# Patient Record
Sex: FEMALE | Race: WHITE | NOT HISPANIC OR LATINO | URBAN - METROPOLITAN AREA
[De-identification: names, ages, dates, MRNs, and addresses within clinical notes are randomized per-mention and may not be internally consistent; named-entity substitution may affect disease eponyms.]

---

## 2021-10-10 ENCOUNTER — EMERGENCY (EMERGENCY)
Facility: HOSPITAL | Age: 21
LOS: 1 days | Discharge: HOME | End: 2021-10-10
Attending: EMERGENCY MEDICINE | Admitting: EMERGENCY MEDICINE
Payer: COMMERCIAL

## 2021-10-10 VITALS
SYSTOLIC BLOOD PRESSURE: 116 MMHG | TEMPERATURE: 98 F | DIASTOLIC BLOOD PRESSURE: 80 MMHG | HEART RATE: 89 BPM | OXYGEN SATURATION: 100 % | WEIGHT: 119.93 LBS | RESPIRATION RATE: 16 BRPM | HEIGHT: 62 IN

## 2021-10-10 DIAGNOSIS — Z88.8 ALLERGY STATUS TO OTHER DRUGS, MEDICAMENTS AND BIOLOGICAL SUBSTANCES: ICD-10-CM

## 2021-10-10 DIAGNOSIS — R00.2 PALPITATIONS: ICD-10-CM

## 2021-10-10 DIAGNOSIS — Z88.0 ALLERGY STATUS TO PENICILLIN: ICD-10-CM

## 2021-10-10 PROCEDURE — 93010 ELECTROCARDIOGRAM REPORT: CPT

## 2021-10-10 PROCEDURE — 99284 EMERGENCY DEPT VISIT MOD MDM: CPT

## 2021-10-10 NOTE — ED PROVIDER NOTE - OBJECTIVE STATEMENT
20 yo female pt, presents w palpitations, feels her heart beats fast at times. Has been worked up for it by cardio. has had a normal echo and holter recently and has an appt to discuss results tomorrow. no chest pain, no sob, no abd pain, no nausea, no vomiting. Has had several medical events this summer including a covid infection and mono. No excess of caffeine or energy drinks. no fever, no chills.

## 2021-10-10 NOTE — ED ADULT TRIAGE NOTE - CHIEF COMPLAINT QUOTE
palpations x 3months, under the care of a cardiologist, worsened this am. pmh of anxiety and bicuspid valve

## 2021-10-10 NOTE — ED PROVIDER NOTE - PATIENT PORTAL LINK FT
You can access the FollowMyHealth Patient Portal offered by [facility] by registering at the following website: [email or web address]. By joining [employer]’s FollowMyHealth portal, you will also be able to view your health information using other applications (apps) compatible with our system.

## 2022-05-12 ENCOUNTER — EMERGENCY (EMERGENCY)
Facility: HOSPITAL | Age: 22
LOS: 1 days | Discharge: HOME | End: 2022-05-12
Attending: EMERGENCY MEDICINE | Admitting: EMERGENCY MEDICINE
Payer: COMMERCIAL

## 2022-05-12 VITALS
SYSTOLIC BLOOD PRESSURE: 110 MMHG | DIASTOLIC BLOOD PRESSURE: 75 MMHG | HEIGHT: 62 IN | WEIGHT: 119.93 LBS | OXYGEN SATURATION: 97 % | TEMPERATURE: 98 F | HEART RATE: 84 BPM | RESPIRATION RATE: 16 BRPM

## 2022-05-12 DIAGNOSIS — R00.2 PALPITATIONS: ICD-10-CM

## 2022-05-12 DIAGNOSIS — Z88.8 ALLERGY STATUS TO OTHER DRUGS, MEDICAMENTS AND BIOLOGICAL SUBSTANCES STATUS: ICD-10-CM

## 2022-05-12 DIAGNOSIS — F41.9 ANXIETY DISORDER, UNSPECIFIED: ICD-10-CM

## 2022-05-12 DIAGNOSIS — Z88.0 ALLERGY STATUS TO PENICILLIN: ICD-10-CM

## 2022-05-12 LAB
ALBUMIN SERPL ELPH-MCNC: 4 G/DL — SIGNIFICANT CHANGE UP (ref 3.4–5)
ALP SERPL-CCNC: 49 U/L — SIGNIFICANT CHANGE UP (ref 40–120)
ALT FLD-CCNC: 21 U/L — SIGNIFICANT CHANGE UP (ref 12–42)
ANION GAP SERPL CALC-SCNC: 8 MMOL/L — LOW (ref 9–16)
AST SERPL-CCNC: 20 U/L — SIGNIFICANT CHANGE UP (ref 15–37)
BASOPHILS # BLD AUTO: 0.02 K/UL — SIGNIFICANT CHANGE UP (ref 0–0.2)
BASOPHILS NFR BLD AUTO: 0.4 % — SIGNIFICANT CHANGE UP (ref 0–2)
BILIRUB SERPL-MCNC: 0.5 MG/DL — SIGNIFICANT CHANGE UP (ref 0.2–1.2)
BUN SERPL-MCNC: 13 MG/DL — SIGNIFICANT CHANGE UP (ref 7–23)
CALCIUM SERPL-MCNC: 8.8 MG/DL — SIGNIFICANT CHANGE UP (ref 8.5–10.5)
CHLORIDE SERPL-SCNC: 106 MMOL/L — SIGNIFICANT CHANGE UP (ref 96–108)
CO2 SERPL-SCNC: 26 MMOL/L — SIGNIFICANT CHANGE UP (ref 22–31)
CREAT SERPL-MCNC: 0.77 MG/DL — SIGNIFICANT CHANGE UP (ref 0.5–1.3)
EGFR: 111 ML/MIN/1.73M2 — SIGNIFICANT CHANGE UP
EOSINOPHIL # BLD AUTO: 0.03 K/UL — SIGNIFICANT CHANGE UP (ref 0–0.5)
EOSINOPHIL NFR BLD AUTO: 0.7 % — SIGNIFICANT CHANGE UP (ref 0–6)
GLUCOSE SERPL-MCNC: 93 MG/DL — SIGNIFICANT CHANGE UP (ref 70–99)
HCG SERPL-ACNC: <1 MIU/ML — SIGNIFICANT CHANGE UP
HCT VFR BLD CALC: 35 % — SIGNIFICANT CHANGE UP (ref 34.5–45)
HGB BLD-MCNC: 12 G/DL — SIGNIFICANT CHANGE UP (ref 11.5–15.5)
IMM GRANULOCYTES NFR BLD AUTO: 0.2 % — SIGNIFICANT CHANGE UP (ref 0–1.5)
LYMPHOCYTES # BLD AUTO: 1.32 K/UL — SIGNIFICANT CHANGE UP (ref 1–3.3)
LYMPHOCYTES # BLD AUTO: 29.3 % — SIGNIFICANT CHANGE UP (ref 13–44)
MAGNESIUM SERPL-MCNC: 2 MG/DL — SIGNIFICANT CHANGE UP (ref 1.6–2.6)
MCHC RBC-ENTMCNC: 30.8 PG — SIGNIFICANT CHANGE UP (ref 27–34)
MCHC RBC-ENTMCNC: 34.3 GM/DL — SIGNIFICANT CHANGE UP (ref 32–36)
MCV RBC AUTO: 90 FL — SIGNIFICANT CHANGE UP (ref 80–100)
MONOCYTES # BLD AUTO: 0.33 K/UL — SIGNIFICANT CHANGE UP (ref 0–0.9)
MONOCYTES NFR BLD AUTO: 7.3 % — SIGNIFICANT CHANGE UP (ref 2–14)
NEUTROPHILS # BLD AUTO: 2.8 K/UL — SIGNIFICANT CHANGE UP (ref 1.8–7.4)
NEUTROPHILS NFR BLD AUTO: 62.1 % — SIGNIFICANT CHANGE UP (ref 43–77)
NRBC # BLD: 0 /100 WBCS — SIGNIFICANT CHANGE UP (ref 0–0)
PLATELET # BLD AUTO: 295 K/UL — SIGNIFICANT CHANGE UP (ref 150–400)
POTASSIUM SERPL-MCNC: 4.2 MMOL/L — SIGNIFICANT CHANGE UP (ref 3.5–5.3)
POTASSIUM SERPL-SCNC: 4.2 MMOL/L — SIGNIFICANT CHANGE UP (ref 3.5–5.3)
PROT SERPL-MCNC: 6.7 G/DL — SIGNIFICANT CHANGE UP (ref 6.4–8.2)
RBC # BLD: 3.89 M/UL — SIGNIFICANT CHANGE UP (ref 3.8–5.2)
RBC # FLD: 12.2 % — SIGNIFICANT CHANGE UP (ref 10.3–14.5)
SODIUM SERPL-SCNC: 140 MMOL/L — SIGNIFICANT CHANGE UP (ref 132–145)
TSH SERPL-MCNC: 1.52 UIU/ML — SIGNIFICANT CHANGE UP (ref 0.36–3.74)
WBC # BLD: 4.51 K/UL — SIGNIFICANT CHANGE UP (ref 3.8–10.5)
WBC # FLD AUTO: 4.51 K/UL — SIGNIFICANT CHANGE UP (ref 3.8–10.5)

## 2022-05-12 PROCEDURE — 93010 ELECTROCARDIOGRAM REPORT: CPT | Mod: NC

## 2022-05-12 PROCEDURE — 99284 EMERGENCY DEPT VISIT MOD MDM: CPT | Mod: 25

## 2022-05-12 NOTE — ED PROVIDER NOTE - PATIENT PORTAL LINK FT
You can access the FollowMyHealth Patient Portal offered by [facility] by registering at the following website: [email or web address]. By joining [email or web address]’s FollowMyHealth portal, you will also be able to view your health information using other applications (apps) compatible with our system.

## 2022-05-12 NOTE — ED ADULT NURSE NOTE - OBJECTIVE STATEMENT
c/o palpitations s/p working out. Called cardiologist who stated to go to ER. Denies chest pain, SOB, current palpitations.

## 2022-05-12 NOTE — ED PROVIDER NOTE - ATTENDING CONTRIBUTION TO CARE
I have seen the pt, reviewed all pertinent clinical data, and I agree with the documentation/care/plan executed by Dr. Rossi.

## 2022-05-12 NOTE — ED PROVIDER NOTE - NSFOLLOWUPINSTRUCTIONS_ED_ALL_ED_FT
You were seen for palpitations.    Your work-up in the Emergency Department did not reveal anything acutely concerning.    Follow up with your primary care physician in the next 2-3 days and bring a copy of your results.    Continue taking all your medications as previously prescribed, unless specifically instructed not to do so by a physician.    If you have any severe increase in pain, fever, uncontrollable nausea vomiting, inability to tolerate eating and drinking, or any other concerning symptoms, return immediately to the Emergency Department.      Palpitations      Palpitations are feelings that your heartbeat is not normal. Your heartbeat may feel like it is:  •Uneven.      •Faster than normal.      •Fluttering.      •Skipping a beat.      This is usually not a serious problem. In some cases, you may need tests to rule out any serious problems.      Follow these instructions at home:    Pay attention to any changes in your condition. Take these actions to help manage your symptoms:    Eating and drinking   •Avoid:  •Coffee, tea, soft drinks, and energy drinks.      •Chocolate.      •Alcohol.      •Diet pills.          Lifestyle    •Try to lower your stress. These things can help you relax:  •Yoga.      •Deep breathing and meditation.      •Exercise.      •Using words and images to create positive thoughts (guided imagery).      •Using your mind to control things in your body (biofeedback).        • Do not use drugs.      •Get plenty of rest and sleep. Keep a regular bed time.        General instructions      •Take over-the-counter and prescription medicines only as told by your doctor.      • Do not use any products that contain nicotine or tobacco, such as cigarettes and e-cigarettes. If you need help quitting, ask your doctor.      •Keep all follow-up visits as told by your doctor. This is important. You may need more tests if palpitations do not go away or get worse.        Contact a doctor if:    •Your symptoms last more than 24 hours.      •Your symptoms occur more often.        Get help right away if you:    •Have chest pain.      •Feel short of breath.      •Have a very bad headache.      •Feel dizzy.      •Pass out (faint).        Summary    •Palpitations are feelings that your heartbeat is uneven or faster than normal. It may feel like your heart is fluttering or skipping a beat.      •Avoid food and drinks that may cause palpitations. These include caffeine, chocolate, and alcohol.      •Try to lower your stress. Do not smoke or use drugs.      •Get help right away if you faint or have chest pain, shortness of breath, a severe headache, or dizziness.      This information is not intended to replace advice given to you by your health care provider. Make sure you discuss any questions you have with your health care provider.

## 2022-05-12 NOTE — ED PROVIDER NOTE - CLINICAL SUMMARY MEDICAL DECISION MAKING FREE TEXT BOX
21F p/w palpitations while working out.  Sxs have since resolved.  Had similar sxs a year ago w/ extensive w/u thereafter, all of which were negative.  VSS, exam as above.  Will assess for anemia vs elec derangements vs thyroid abnormalities vs arrhythmias.  Will reassess and dispo pending results.

## 2022-05-12 NOTE — ED PROVIDER NOTE - PROGRESS NOTE DETAILS
Flo PGY3 - W/u unremarkable.  Discussed results w/ pt., who understands them.  Will dc w/ cards and PCP f/u.  Pt. aware and agrees w/ plan.  All other questions and concerns have been addressed.

## 2022-05-12 NOTE — ED PROVIDER NOTE - OBJECTIVE STATEMENT
21F w/ no significant PMHx p/w palpitations that she noticed while working out today morning.  States she felt slightly nauseous yesterday during her workout and had to leave early.  No cp, sob.  Sxs resolved ~30 mins ago.  Pt. had a similar experience last year and was thoroughly evaluated and seen by a cardiologist.  All w/u was unremarkable.  Pt. was diagnosed w/ anxiety and tried Lexapro but did not like the way it made her feel so dc/d it.  Pt. had thyroid studies this past March, which were normal.

## 2022-05-15 ENCOUNTER — EMERGENCY (EMERGENCY)
Facility: HOSPITAL | Age: 22
LOS: 1 days | Discharge: HOME | End: 2022-05-15
Admitting: EMERGENCY MEDICINE
Payer: COMMERCIAL

## 2022-05-15 VITALS
OXYGEN SATURATION: 97 % | TEMPERATURE: 98 F | WEIGHT: 119.93 LBS | SYSTOLIC BLOOD PRESSURE: 123 MMHG | RESPIRATION RATE: 18 BRPM | HEART RATE: 92 BPM | DIASTOLIC BLOOD PRESSURE: 88 MMHG | HEIGHT: 62 IN

## 2022-05-15 PROBLEM — F41.9 ANXIETY DISORDER, UNSPECIFIED: Chronic | Status: ACTIVE | Noted: 2022-05-12

## 2022-05-15 PROCEDURE — 99283 EMERGENCY DEPT VISIT LOW MDM: CPT

## 2022-05-15 RX ORDER — FAMOTIDINE 10 MG/ML
1 INJECTION INTRAVENOUS
Qty: 28 | Refills: 0
Start: 2022-05-15 | End: 2022-06-11

## 2022-05-15 RX ORDER — ONDANSETRON 8 MG/1
1 TABLET, FILM COATED ORAL
Qty: 15 | Refills: 0
Start: 2022-05-15

## 2022-05-15 NOTE — ED PROVIDER NOTE - PATIENT PORTAL LINK FT
You can access the FollowMyHealth Patient Portal offered by [facility] by registering at the following website: [email or web address]. By joining [employer]’s FollowMyHealth portal, you will also be able to view your health information using other applications (apps) compatible with our system.

## 2022-05-15 NOTE — ED ADULT NURSE NOTE - OBJECTIVE STATEMENT
Pt presents to ED complaining of nausea with "regurgitation" but not vomiting since Thursday after taking fluconazole for yeast infection. Denies any fevers, chills, diarrhea, constipation. Endorses decreased PO intake and periumbilical abdominal tenderness. Denies diff with urination. States that yeast inf has since cleared up.

## 2022-05-15 NOTE — ED PROVIDER NOTE - CLINICAL SUMMARY MEDICAL DECISION MAKING FREE TEXT BOX
young female with nausea and dyspepsia after taking diflucan for a vaginal yeast infection, now improved. tolerating po. had labs 3 days ago in ED which were normal, not pregnant. patient looks well, tolerating po. physical exam unremarkable. patient declined GI cocktail in ED. will rx zofran/pepcid. advised to start taking probiotics otc. lifestyle changes discussed, f/u pmd, return precautions discussed. will dc.

## 2022-05-15 NOTE — ED PROVIDER NOTE - PHYSICAL EXAMINATION
CONSTITUTIONAL: Well-appearing; well-nourished; in no apparent distress.   	HEAD: Normocephalic; atraumatic.   	EYES:  conjunctiva and sclera clear  	ENT: normal nose; no rhinorrhea; normal pharynx with no erythema or lesions.   	NECK: Supple; non-tender;   	CARDIOVASCULAR: Normal S1, S2; no murmurs, rubs, or gallops. Regular rate and rhythm.   	RESPIRATORY: Breathing easily; breath sounds clear and equal bilaterally; no wheezes, rhonchi, or rales.  	GI: Soft; non-distended; non-tender; no guarding or rebound. no cvat bilaterally.   	EXT: No cyanosis or edema; N/V intact  	SKIN: Normal for age and race; warm; dry; good turgor; no apparent lesions or rash.   	NEURO: A & O x 3; face symmetric; grossly unremarkable.   PSYCHOLOGICAL: The patient’s mood and manner are appropriate.

## 2022-05-15 NOTE — ED ADULT NURSE NOTE - VOIDING
I will STOP taking the medications listed below when I get home from the hospital:  None without difficulty

## 2022-05-15 NOTE — ED ADULT TRIAGE NOTE - CHIEF COMPLAINT QUOTE
c/o nausea with accompanying dizziness since Friday s/p taking prescribed fluconazole on thurs for yeast infection by PCP.  not able to eat food but hydrating well. was here on Thursday before seeing PCP for unrelated sxs. c/o nausea since Friday s/p taking prescribed fluconazole on thurs for yeast infection by PCP.  not able to eat food and is starting to feel dizzy but hydrating well. was here on Thursday before seeing PCP for unrelated sxs.

## 2022-05-15 NOTE — ED PROVIDER NOTE - NSFOLLOWUPINSTRUCTIONS_ED_ALL_ED_FT
Take medications as prescribed  Stay hydrated and drink plenty of fluids.  Follow up with your doctor for re-evaluation    Avoid foods or drinks that make your symptoms worse such as caffeine, chocolate, spicy foods, acidic foods, or alcohol.    SEEK IMMEDIATE MEDICAL CARE IF YOU HAVE ANY OF THE FOLLOWING SYMPTOMS: black or bloody stools, blood or coffee-ground-colored vomitus, worsening abdominal pain, fever, or inability to keep fluids down.

## 2022-05-15 NOTE — ED PROVIDER NOTE - OBJECTIVE STATEMENT
22 yo female seen 3 days ago in this ED for palpitations, had normal labs, hcg neg, presents c/o nausea after taking diflucan for a yeast infection a few days ago with assoc acid reflex symptoms, burning to epigastrium. no vomiting. tolerating po. history of acid reflux, had been on PPI in the past but no longer taking it as she states she didn't like the way it made her feel. currently feeling better overall and declining GI cocktail here. last endoscopy one year ago and was normal as per patient. no fever/chills. no cp/sob/syncope or back pain.

## 2022-05-15 NOTE — ED ADULT NURSE NOTE - CHIEF COMPLAINT QUOTE
c/o nausea since Friday s/p taking prescribed fluconazole on thurs for yeast infection by PCP.  not able to eat food and is starting to feel dizzy but hydrating well. was here on Thursday before seeing PCP for unrelated sxs.

## 2022-05-17 DIAGNOSIS — R11.0 NAUSEA: ICD-10-CM

## 2022-05-17 DIAGNOSIS — R10.13 EPIGASTRIC PAIN: ICD-10-CM

## 2022-05-17 DIAGNOSIS — Z88.0 ALLERGY STATUS TO PENICILLIN: ICD-10-CM

## 2022-12-16 NOTE — ED ADULT TRIAGE NOTE - BP NONINVASIVE DIASTOLIC (MM HG)
For information on Fall & Injury Prevention, visit: [email or web address] OR  [email or web address] OR  https://www.cdc.gov/steadi/patient.html 75

## 2023-05-26 NOTE — ED ADULT TRIAGE NOTE - NS ED TRIAGE AVPU SCALE
Alert-The patient is alert, awake and responds to voice. The patient is oriented to time, place, and person. The triage nurse is able to obtain subjective information. Is Methotrexate Contraindicated?: No Dupixent Monitoring Guidelines: There is no laboratory monitoring requirement with Dupixent. Pregnancy And Lactation Warning Text: There have not been adverse fetal risks in women taking Dupixent while pregnant. It is unknown if this medication is excreted in breast milk. Dupixent Dosing: Use Override Dosage Dupixent Dosing Override: Need to weight pt; if 15 to 29kg, 300mg q4 weeks; doubt <15kg, but if so, then 200mg q4 weeks Diagnosis (Required): Atopic Dermatitis/Eczematous Dermatitis Detail Level: Zone

## 2024-02-21 ENCOUNTER — EMERGENCY (EMERGENCY)
Facility: HOSPITAL | Age: 24
LOS: 1 days | Discharge: HOME | End: 2024-02-21
Admitting: EMERGENCY MEDICINE
Payer: COMMERCIAL

## 2024-02-21 VITALS
HEART RATE: 86 BPM | TEMPERATURE: 98 F | HEIGHT: 62 IN | WEIGHT: 125 LBS | OXYGEN SATURATION: 98 % | RESPIRATION RATE: 15 BRPM | DIASTOLIC BLOOD PRESSURE: 83 MMHG | SYSTOLIC BLOOD PRESSURE: 132 MMHG

## 2024-02-21 DIAGNOSIS — R79.1 ABNORMAL COAGULATION PROFILE: ICD-10-CM

## 2024-02-21 DIAGNOSIS — M79.661 PAIN IN RIGHT LOWER LEG: ICD-10-CM

## 2024-02-21 DIAGNOSIS — Z88.8 ALLERGY STATUS TO OTHER DRUGS, MEDICAMENTS AND BIOLOGICAL SUBSTANCES STATUS: ICD-10-CM

## 2024-02-21 DIAGNOSIS — M25.471 EFFUSION, RIGHT ANKLE: ICD-10-CM

## 2024-02-21 DIAGNOSIS — Z88.0 ALLERGY STATUS TO PENICILLIN: ICD-10-CM

## 2024-02-21 PROCEDURE — 99284 EMERGENCY DEPT VISIT MOD MDM: CPT

## 2024-02-21 PROCEDURE — 93971 EXTREMITY STUDY: CPT | Mod: 26,RT

## 2024-02-21 NOTE — ED PROVIDER NOTE - NSFOLLOWUPINSTRUCTIONS_ED_ALL_ED_FT
Swelling in the legs, ankles, and feet is called edema. It is common after you sit or stand for a while. Long plane flights or car rides often cause swelling in the legs and feet. You may also have swelling if you have to stand for long periods of time at your job. Problems with the veins in the legs (varicose veins) and changes in hormones can also cause swelling. Sometimes the swelling in the ankles and feet is caused by a more serious problem, such as heart failure, infection, blood clots, or liver or kidney disease.  you can use 1000mg tylenol every 6 hours and 600mg Motrin every 8 hours as needed for swelling and pain control  Follow-up care is a key part of your treatment and safety. Be sure to make and go to all appointments, and call your doctor or nurse advice line (699 in most provinces and territories) if you are having problems. It's also a good idea to know your test results and keep a list of the medicines you take.    How can you care for yourself at home?  If your doctor gave you medicine, take it as prescribed. Call your doctor or nurse advice line if you think you are having a problem with your medicine.  Whenever you are resting, raise your legs up. Try to keep the swollen area higher than the level of your heart.  Take breaks from standing or sitting in one position.  Walk around to increase the blood flow in your lower legs.  Move your feet and ankles often while you stand, or tighten and relax your leg muscles.  Wear support stockings. Put them on in the morning, before swelling gets worse.  Eat a balanced diet. Lose weight if you need to.  Limit the amount of salt (sodium) in your diet. Salt holds fluid in the body and may increase swelling.  When should you call for help?  	  Call 911 anytime you think you may need emergency care. For example, call if:    You have symptoms of a blood clot in your lung (called a pulmonary embolism). These may include:  Sudden chest pain.  Trouble breathing.  Coughing up blood.  Call your doctor or nurse advice line now or seek immediate medical care if:    You have signs of a blood clot, such as:  Pain in your calf, back of the knee, thigh, or groin.  Redness and swelling in your leg or groin.  You have symptoms of infection, such as:  Increased pain, swelling, warmth, or redness.  Red streaks or pus.  A fever.  Watch closely for changes in your health, and be sure to contact your doctor or nurse advice line if:    Your swelling is getting worse.  You have new or worsening pain in your legs.  You do not get better as expected.

## 2024-02-21 NOTE — ED PROVIDER NOTE - OBJECTIVE STATEMENT
23-year-old female no significant past medical history presents today with right ankle swelling for the last week.  Patient reports she was in [place] when she developed this ankle swelling, notes it was atraumatic in nature and there was no associated injury or trauma to the area.  She had an x-ray done in urgent care which was unrevealing for fracture.  She was instructed to follow-up with her primary care doctor, no she followed up yesterday with her primary doctor, had blood workup done including a D-dimer.  Patient reports the D-dimer was elevated and was sent to the emergency room to rule out DVT.  Patient denies trauma, paresthesias, muscle weakness, OCP use, recent surgeries or calf pain.  Denies shortness of breath or chest pain.

## 2024-02-21 NOTE — ED PROVIDER NOTE - CLINICAL SUMMARY MEDICAL DECISION MAKING FREE TEXT BOX
23-year-old female no significant past medical history presents today with right ankle swelling for the last week.  Patient reports she was in [place] when she developed this ankle swelling, notes it was atraumatic in nature and there was no associated injury or trauma to the area.  She had an x-ray done in urgent care which was unrevealing for fracture.  She was instructed to follow-up with her primary care doctor, no she followed up yesterday with her primary doctor, had blood workup done including a D-dimer.  Patient reports the D-dimer was elevated and was sent to the emergency room to rule out DVT.  Physical examination as noted above, given the outpatient elevated D-dimer with lower extremity swelling, will obtain right lower extremity duplex to rule out DVT.  This can be related to ligamentous injury as patient had negative x-ray, will follow-up duplex for follow-up 23-year-old female no significant past medical history presents today with right ankle swelling for the last week.  Patient reports she was in [place] when she developed this ankle swelling, notes it was atraumatic in nature and there was no associated injury or trauma to the area.  She had an x-ray done in urgent care which was unrevealing for fracture.  She was instructed to follow-up with her primary care doctor, no she followed up yesterday with her primary doctor, had blood workup done including a D-dimer.  Patient reports the D-dimer was elevated and was sent to the emergency room to rule out DVT.  Physical examination as noted above, given the outpatient elevated D-dimer with lower extremity swelling, will obtain right lower extremity duplex to rule out DVT.  This can be related to ligamentous injury as patient had negative x-ray, will follow-up duplex for follow-up  Duplex neg for DVT, pt has ortho f/u for MRI   Return prec d/w pt

## 2024-02-21 NOTE — ED ADULT TRIAGE NOTE - WEIGHT METHOD
Raheem dc'd for shower;saline lock noted to be out;pt stated she to be out;pt states she took it out when iv fluids were dc'd   stated

## 2024-02-21 NOTE — ED PROVIDER NOTE - PHYSICAL EXAMINATION
· CONSTITUTIONAL: Well appearing, well nourished, awake, alert, oriented to person, place, time/situation and in no apparent distress.  · HEAD: Head atraumatic, normal cephalic shape.  · HEAD: Head is atraumatic. Head shape is symmetrical.  · CARDIAC: Normal rate, regular rhythm.  Heart sounds S1, S2.  No murmurs, rubs or gallops.  · RESPIRATORY: Breath sounds clear and equal bilaterally.  · GASTROINTESTINAL: Abdomen soft, non-tender, no guarding.  · MUSCULOSKELETAL: RLE- trace nonpitting edema around the right ankle w TTP posterior distal tib fib with mild calf TTP. no overlying erythema. FROM of b/l toes, ankle, knee without pain. Dp/PT 2+, SILT, cap refill < 3 sec. Spine appears normal, range of motion is not limited, no muscle or joint tenderness  · NEUROLOGICAL: Alert and oriented, no focal deficits, no motor or sensory deficits.  · SKIN: Skin normal color for race, warm, dry and intact. No evidence of rash.  · PSYCHIATRIC: Alert and oriented to person, place, time/situation. normal mood and affect. no apparent risk to self or others.

## 2024-02-21 NOTE — ED PROVIDER NOTE - PATIENT PORTAL LINK FT
You can access the FollowMyHealth Patient Portal offered by [facility] by registering at the following website: [email or web address]. By joining [employer]’s FollowMyHealth portal, you will also be able to view your health information using other applications (apps) compatible with our system.

## 2024-02-21 NOTE — ED ADULT NURSE REASSESSMENT NOTE - NS ED NURSE REASSESS COMMENT FT1
Received pt from day shift resting comfortably in exam chair and is awaiting US results. Comfort measures maintained.

## 2024-02-21 NOTE — ED ADULT NURSE NOTE - NSFALLUNIVINTERV_ED_ALL_ED
Bed/Stretcher in lowest position, wheels locked, appropriate side rails in place/Call bell, personal items and telephone in reach/Instruct patient to call for assistance before getting out of bed/chair/stretcher/Non-slip footwear applied when patient is off stretcher/[place] to call system/Physically safe environment - no spills, clutter or unnecessary equipment/Purposeful proactive rounding/Room/bathroom lighting operational, light cord in reach

## 2024-07-11 NOTE — ED PROVIDER NOTE - NS ED ATTENDING STATEMENT MOD
Thank you for choosing the [facility] for your EMG testing.    The ordering provider will receive your final EMG results within the next few days.  Please follow up with your provider for the results and further treatment recommendations.    Attending with